# Patient Record
(demographics unavailable — no encounter records)

---

## 2025-07-25 NOTE — HISTORY OF PRESENT ILLNESS
[FreeTextEntry1] : 7/25/25 - Patient reports occ SSCP not related to exertion, lasting hours, non-tender to touch.  Patient deniess  Patient denies palpitations.  Patient denies lightheadedness.  Patient denies h/o syncope.  Pt has family history of CAD.  Father had CABG in his 60's.  She is on Amlodipine 2.5 mg and Atorvastatin 20 mg.  /82 HR 86.  Exam unremarkable.  ECG showed NSR, no STTw changes.  I advised patient to undergo an echocardiogram and a treadmill stress test.     1/13/23 - Pt reports one episode of continuous SSCP which lasted 1-2 days.  She admitted carrying heavy stuff.  Pt reports SOB when she caught cold last month.  It get better now.  Pt denies palpitations.  Pt denies h/o syncope.  She is on Amlodipine 2.5 mg and Atorvastatin 20 mg.  I advised patient to undergo an echocardiogram and a treadmill stress test.  Patient underwent an echocardiogram and it showed normal LV function without significant valvular pathology. Patient underwent a treadmill stress test and completed 9.5 minutes of Feliz protocol.  There were upsloping ST depressions on ECG but no symptoms.  Following treadmill stress, there was no echocardiographic evidence of ischemia.    (1) Chest discomfort, non-exertional - Patient underwent an echocardiogram and it showed normal LV function without significant valvular pathology. Patient underwent a treadmill stress test and completed 9.5 minutes of Feliz protocol. There were upsloping ST depressions on ECG but no symptoms. Following treadmill stress, there was no echocardiographic evidence of ischemia. Patient was reassured. Her symptom may be musculoskeletal in etiology.  (2) HTN - Her BP was elevated initially but was acceptable during stress testing. I advised patient to continue Amlodipine 2.5 mg QD.  (3) Followup - as needed.

## 2025-07-25 NOTE — REASON FOR VISIT
[FreeTextEntry3] : Chelsea Nesbitt [FreeTextEntry1] : 59 year old female with HTN, HLD, presents for followup.    Patient was previously seen on 1/13/23 for evaluation of CP.  Pt reports one episode of continuous SSCP which lasted 1-2 days.  She admitted carrying heavy stuff.  Pt reports SOB when she caught cold last month.  It get better now.  Pt denies palpitations.  Pt denies h/o syncope.  She is on Amlodipine 2.5 mg and Atorvastatin 20 mg.  I advised patient to undergo an echocardiogram and a treadmill stress test.  Patient underwent an echocardiogram and it showed normal LV function without significant valvular pathology. Patient underwent a treadmill stress test and completed 9.5 minutes of Feliz protocol.  There were upsloping ST depressions on ECG but no symptoms.  Following treadmill stress, there was no echocardiographic evidence of ischemia.    She is on Amlodipine 2.5 mg and Atorvastatin 20 mg.